# Patient Record
Sex: MALE | Race: BLACK OR AFRICAN AMERICAN
[De-identification: names, ages, dates, MRNs, and addresses within clinical notes are randomized per-mention and may not be internally consistent; named-entity substitution may affect disease eponyms.]

---

## 2018-09-30 ENCOUNTER — HOSPITAL ENCOUNTER (EMERGENCY)
Dept: HOSPITAL 75 - ER | Age: 23
Discharge: HOME | End: 2018-09-30
Payer: SELF-PAY

## 2018-09-30 VITALS — HEIGHT: 66 IN | BODY MASS INDEX: 27.32 KG/M2 | WEIGHT: 170 LBS

## 2018-09-30 VITALS — DIASTOLIC BLOOD PRESSURE: 94 MMHG | SYSTOLIC BLOOD PRESSURE: 143 MMHG

## 2018-09-30 DIAGNOSIS — R53.83: ICD-10-CM

## 2018-09-30 DIAGNOSIS — R53.81: Primary | ICD-10-CM

## 2018-09-30 LAB
ALBUMIN SERPL-MCNC: 4.5 GM/DL (ref 3.2–4.5)
ALP SERPL-CCNC: 53 U/L (ref 40–136)
ALT SERPL-CCNC: 29 U/L (ref 0–55)
AMORPH SED URNS QL MICRO: (no result) /LPF
APTT PPP: YELLOW S
BACTERIA #/AREA URNS HPF: (no result) /HPF
BASOPHILS # BLD AUTO: 0 10^3/UL (ref 0–0.1)
BASOPHILS NFR BLD AUTO: 1 % (ref 0–10)
BILIRUB SERPL-MCNC: 1 MG/DL (ref 0.1–1)
BILIRUB UR QL STRIP: NEGATIVE
BUN/CREAT SERPL: 9
CALCIUM SERPL-MCNC: 9.3 MG/DL (ref 8.5–10.1)
CHLORIDE SERPL-SCNC: 106 MMOL/L (ref 98–107)
CO2 SERPL-SCNC: 25 MMOL/L (ref 21–32)
CREAT SERPL-MCNC: 1.05 MG/DL (ref 0.6–1.3)
EOSINOPHIL # BLD AUTO: 0.4 10^3/UL (ref 0–0.3)
EOSINOPHIL NFR BLD AUTO: 5 % (ref 0–10)
ERYTHROCYTE [DISTWIDTH] IN BLOOD BY AUTOMATED COUNT: 13.2 % (ref 10–14.5)
FIBRINOGEN PPP-MCNC: (no result) MG/DL
GFR SERPLBLD BASED ON 1.73 SQ M-ARVRAT: > 60 ML/MIN
GLUCOSE SERPL-MCNC: 78 MG/DL (ref 70–105)
GLUCOSE UR STRIP-MCNC: NEGATIVE MG/DL
HCT VFR BLD CALC: 40 % (ref 40–54)
HGB BLD-MCNC: 14.1 G/DL (ref 13.3–17.7)
KETONES UR QL STRIP: NEGATIVE
LEUKOCYTE ESTERASE UR QL STRIP: NEGATIVE
LYMPHOCYTES # BLD AUTO: 3.2 X 10^3 (ref 1–4)
LYMPHOCYTES NFR BLD AUTO: 49 % (ref 12–44)
MANUAL DIFFERENTIAL PERFORMED BLD QL: NO
MCH RBC QN AUTO: 30 PG (ref 25–34)
MCHC RBC AUTO-ENTMCNC: 36 G/DL (ref 32–36)
MCV RBC AUTO: 85 FL (ref 80–99)
MONOCYTES # BLD AUTO: 0.6 X 10^3 (ref 0–1)
MONOCYTES NFR BLD AUTO: 9 % (ref 0–12)
NEUTROPHILS # BLD AUTO: 2.4 X 10^3 (ref 1.8–7.8)
NEUTROPHILS NFR BLD AUTO: 36 % (ref 42–75)
NITRITE UR QL STRIP: NEGATIVE
PH UR STRIP: 8 [PH] (ref 5–9)
PLATELET # BLD: 196 10^3/UL (ref 130–400)
PMV BLD AUTO: 11.3 FL (ref 7.4–10.4)
POTASSIUM SERPL-SCNC: 3.5 MMOL/L (ref 3.6–5)
PROT SERPL-MCNC: 7.4 GM/DL (ref 6.4–8.2)
PROT UR QL STRIP: NEGATIVE
RBC # BLD AUTO: 4.69 10^6/UL (ref 4.35–5.85)
RBC #/AREA URNS HPF: (no result) /HPF
SODIUM SERPL-SCNC: 141 MMOL/L (ref 135–145)
SP GR UR STRIP: 1.01 (ref 1.02–1.02)
SQUAMOUS #/AREA URNS HPF: (no result) /HPF
TSH SERPL DL<=0.05 MIU/L-ACNC: 2.31 UIU/ML (ref 0.35–4.94)
UROBILINOGEN UR-MCNC: 1 MG/DL
WBC # BLD AUTO: 6.5 10^3/UL (ref 4.3–11)
WBC #/AREA URNS HPF: (no result) /HPF

## 2018-09-30 PROCEDURE — 87804 INFLUENZA ASSAY W/OPTIC: CPT

## 2018-09-30 PROCEDURE — 84443 ASSAY THYROID STIM HORMONE: CPT

## 2018-09-30 PROCEDURE — 81000 URINALYSIS NONAUTO W/SCOPE: CPT

## 2018-09-30 PROCEDURE — 36415 COLL VENOUS BLD VENIPUNCTURE: CPT

## 2018-09-30 PROCEDURE — 71046 X-RAY EXAM CHEST 2 VIEWS: CPT

## 2018-09-30 PROCEDURE — 80053 COMPREHEN METABOLIC PANEL: CPT

## 2018-09-30 PROCEDURE — 85025 COMPLETE CBC W/AUTO DIFF WBC: CPT

## 2018-09-30 PROCEDURE — 86308 HETEROPHILE ANTIBODY SCREEN: CPT

## 2018-09-30 NOTE — XMS REPORT
[facility]

 Created on: 2018



[place]

External Reference #: [id number]

: 1995

Sex: Male



Demographics







 [place]  [address] [place]  [address]

 

 Preferred Language  Unknown

 

 Marital Status  Unknown

 

 Temple Affiliation  Unknown

 

 Race  Unknown

 

 Ethnic Group  Unknown





Author







 Author  ROLLY PABLO

 

 [facility]  [facility]

 

 Address  [address] [place]  [address]



 

 Phone  [phone]







Care Team Providers







 Care Team Member Name  Role  Phone

 

 ROLLY PABLO  Unavailable  [phone]







PROBLEMS

Unknown Problems



ALLERGIES

No Known Allergies



ENCOUNTERS







 Encounter  Location  Date  Diagnosis

 

 [facility]  [address] [facility] [id number] [phone]  27 Mar, 2018  Acute non-recurrent maxillary sinusitis J01.00







IMMUNIZATIONS







 Vaccine  Route  Administration Date  Status

 

 DEXAMETHASONE 4MG/ML (PER 1 MG)  IM Intramuscular  2018  Administered

 

 DEPO MEDROL 40 MG/ML  IM Intramuscular  2018  Administered







SOCIAL HISTORY

Never Assessed



REASON FOR VISIT

head congestion, denies cough, sinus pressure. been sick for a month. [email or web address]



PLAN OF CARE







 Activity  Details









  









 Follow Up  prn Reason:







VITAL SIGNS







 Height  68 in  2018

 

 Weight  183.2 lbs  2018

 

 Temperature  98.0 degrees Fahrenheit  2018

 

 Heart Rate  72 bpm  2018

 

 Respiratory Rate  18   2018

 

 BMI  27.85 kg/m2  2018

 

 Blood pressure systolic  116 mmHg  2018

 

 Blood pressure diastolic  72 mmHg  2018







MEDICATIONS







 Medication  Instructions  Dosage  Frequency  Start Date  End Date  Duration  
Status

 

 Zyrtec Allergy 10 MG  Orally Once a day  1 tablet  24h  27 Mar, 2018  26 Apr, 
2018  30 day(s)  Active

 

 Augmentin 875-125 MG  Orally every 12 hrs  1 tablet  12h  27 Mar, 2018  6 Apr, 
2018  10 day(s)  Active

 

 Flonase 50 MCG/ACT  Nasally Once a day  1 spray in each nostril  24h  27 Mar, 
2018     30 day(s)  Active







RESULTS

No Results



PROCEDURES







 Procedure  Date Ordered  Result  Body Site

 

 DEXAMETHASONE 4MG/ML (PER 1 MG)  2018      

 

 THER/PROPH/DIAG INJ, SC/IM  2018      

 

 DEPO MEDROL 40 MG/ML  2018      







INSTRUCTIONS





MEDICATIONS ADMINISTERED

No Known Medications

## 2018-09-30 NOTE — DIAGNOSTIC IMAGING REPORT
INDICATION: Flu symptoms.



PA and lateral views of the chest were obtained.



FINDINGS: The heart size, mediastinal configuration, and

pulmonary vascularity are within normal limits. There is no

pleural effusion, pneumothorax, or pneumonia. The osseous

structures are unremarkable. 



IMPRESSION: No acute cardiopulmonary abnormality.



Dictated by: 



  Dictated on workstation # [id number]

## 2018-09-30 NOTE — ED GENERAL
General


Chief Complaint:  General Problems/Pain


Stated Complaint:  TIRED,DIZZY


Nursing Triage Note:  


AMBULATORY TO ED WITH C/O FEELING TIRED/FATIGUED FOR A WEEK WITH CHILLS. 


PRESENTED TO ER BECAUSE HE "COULD NOT GET TO SLEEP AND WAS SHIVERING". DENIES 


COUGH, SORE THROAT, BUT DOES HAVE RUNNY NOSE AND HEADACHE.


Nursing Sepsis Screen:  No Definite Risk


Source of Information:  Patient





History of Present Illness


Date Seen by Provider:  Sep 30, 2018


Time Seen by Provider:  09:25


Initial Comments


PT ARRIVES VIA POV FROM HOME


STATES SINCE YESTERDAY HE HAS BEEN FEELING TIRED, NO ENERGY, DOESN'T FEEL LIKE 

DOING ANYTHING


C/O FEELING COLD ALL THE TIME AND TONIGHT WAS SHIVERING AND COULDN'T SLEEP 

BECAUSE HE WAS COLD


HAS HAD A SLIGHT RUNNY NOSE WITH CLEAR DRAINAGE


C/O SLIGHT HEADACHE


NO COUGH


NO BODY ACHES


NO NECK PAIN OR STIFFNESS


NO SHORTNESS OF BREATH


NO SORE THROAT





NO OTHER SYMPTOMS 





NO HISTORY OF SIMILAR





NO KNOWN SICK CONTACTS. 





TOOK 4 IBUPROFEN AT 2200 TONIGHT, OTHERWISE HAS NOT TAKEN ANYTHING FOR SYMPTOMS





NO PCP--STATES HE LIVES HERE AND WORKS IN [place] ALL WEEK.





Allergies and Home Medications


Allergies


Coded Allergies:  


     No Known Drug Allergies (Unverified , 9/30/18)





Home Medications


No Active Prescriptions or Reported Meds





Patient Home Medication List


Home Medication List Reviewed:  Yes





Review of Systems


Review of Systems


Constitutional:  see HPI, chills; No diaphoresis, No dizziness, No fever; 

malaise


EENTM:  nose congestion; No ear pain, No blurred vision, No eye pain, No throat 

pain


Respiratory:  no symptoms reported; No cough, No short of breath


Cardiovascular:  no symptoms reported; No chest pain, No edema, No palpitations

, No syncope, No vascular heart diseas


Gastrointestinal:  no symptoms reported; No abdominal pain, No diarrhea, No 

nausea, No vomiting


Genitourinary:  no symptoms reported


Musculoskeletal:  no symptoms reported; No back pain, No muscle pain, No muscle 

stiffness, No neck pain


Skin:  no symptoms reported


Psychiatric/Neurological:  See HPI, Headache; Denies Numbness, Denies 

Paresthesia, Denies Seizure, Denies Tingling, Denies Weakness


Hematologic/Lymphatic:  No Symptoms Reported


Immunological/Allergic:  no symptoms reported





Past Medical-Social-Family Hx


Patient Social History


Alcohol Use:  Occasionally Uses


Recreational Drug Use:  No


Smoking Status:  Never a Smoker


2nd Hand Smoke Exposure:  No


Recent Foreign Travel:  No


Contact w/Someone Who Travel:  No


Recent Infectious Disease Expo:  No


Recent Hopitalizations:  No





Immunizations Up To Date


Tetanus Booster (TDap):  Unknown





Seasonal Allergies


Seasonal Allergies:  No





Past Medical History


Surgeries:  No


Respiratory:  No


Cardiac:  No


Neurological:  No


Genitourinary:  No


Gastrointestinal:  No


Musculoskeletal:  No


Endocrine:  No


HEENT:  No


Cancer:  No


Psychosocial:  No


Integumentary:  No


Blood Disorders:  No





Physical Exam


Vital Signs





Vital Signs - First Documented








 9/30/18





 02:49


 


Temp 98.4


 


Pulse 89


 


Resp 17


 


B/P (MAP) 143/94 (110)





Capillary Refill : Less Than 3 Seconds


Height, Weight, BMI


Height: 5'6.00"


Weight: 170lbs. oz. 77.028872tr;  BMI


Method:Stated


General Appearance:  No Apparent Distress, WD/WN


HEENT:  PERRL/EOMI, TMs Normal, Normal ENT Inspection, Pharynx Normal, Moist 

Mucous Membranes


Neck:  Full Range of Motion, Normal Inspection, Non Tender, Supple; No 

Lymphadenopathy (L), No Lymphadenopathy (R), No Tender Lateral, No Tender 

Midline, No Thyromegaly


Respiratory:  Normal Breath Sounds, No Accessory Muscle Use, No Respiratory 

Distress


Cardiovascular:  Regular Rate, Rhythm, No Edema, No JVD, No Murmur, Normal 

Peripheral Pulses


Gastrointestinal:  Normal Bowel Sounds, No Organomegaly, No Pulsatile Mass, Non 

Tender, Soft


Back:  Normal Inspection, No CVA Tenderness, No Vertebral Tenderness


Extremity:  Normal Capillary Refill, Normal Inspection, Normal Range of Motion, 

Non Tender, No Calf Tenderness, No Pedal Edema


Neurologic/Psychiatric:  Alert, Oriented x3, No Motor/Sensory Deficits, Normal 

Mood/Affect, CNs II-XII Norm as Tested


Skin:  Normal Color (PT IS BLACK), Warm/Dry





Progress/Results/Core Measures


Suspected Sepsis


Recent Fever Within 48 Hours:  Yes


Infection Criteria Present:  Suspected New Infection


New/Unexplained  Altered Menta:  No


Sepsis Screen:  No Definite Risk


SIRS


Temperature:98.4 


Pulse: 89 


Respiratory Rate: 17


 


Laboratory Tests


9/30/18 04:00: White Blood Count 6.5


Blood Pressure 143 /94 


Mean: 110


 


Laboratory Tests


9/30/18 04:00: 


Creatinine 1.05, Platelet Count 196, Total Bilirubin 1.0








Results/Orders


Lab Results





Laboratory Tests








Test


 9/30/18


03:55 9/30/18


04:00 Range/Units


 


 


Urine Color YELLOW    


 


Urine Clarity VERY CLOUDY H   


 


Urine pH 8   5-9  


 


Urine Specific Gravity 1.015 L  1.016-1.022  


 


Urine Protein NEGATIVE   NEGATIVE  


 


Urine Glucose (UA) NEGATIVE   NEGATIVE  


 


Urine Ketones NEGATIVE   NEGATIVE  


 


Urine Nitrite NEGATIVE   NEGATIVE  


 


Urine Bilirubin NEGATIVE   NEGATIVE  


 


Urine Urobilinogen 1   NORMAL  MG/DL


 


Urine Leukocyte Esterase NEGATIVE   NEGATIVE  


 


Urine RBC (Auto) NEGATIVE   NEGATIVE  


 


Urine RBC NONE    /HPF


 


Urine WBC NONE    /HPF


 


Urine Squamous Epithelial


Cells RARE 


 


  /HPF





 


Urine Crystals PRESENT H   /LPF


 


Urine Amorphous Sediment


 LARGE CHRISTIE


PHOSPHATE H 


  /LPF





 


Urine Bacteria TRACE    /HPF


 


Urine Casts NONE    /LPF


 


Urine Mucus NEGATIVE    /LPF


 


Urine Culture Indicated NO    


 


White Blood Count


 


 6.5 


 4.3-11.0


10^3/uL


 


Red Blood Count


 


 4.69 


 4.35-5.85


10^6/uL


 


Hemoglobin  14.1  13.3-17.7  G/DL


 


Hematocrit  40  40-54  %


 


Mean Corpuscular Volume  85  80-99  FL


 


Mean Corpuscular Hemoglobin  30  25-34  PG


 


Mean Corpuscular Hemoglobin


Concent 


 36 


 32-36  G/DL





 


Red Cell Distribution Width  13.2  10.0-14.5  %


 


Platelet Count


 


 196 


 130-400


10^3/uL


 


Mean Platelet Volume  11.3 H 7.4-10.4  FL


 


Neutrophils (%) (Auto)  36 L 42-75  %


 


Lymphocytes (%) (Auto)  49 H 12-44  %


 


Monocytes (%) (Auto)  9  0-12  %


 


Eosinophils (%) (Auto)  5  0-10  %


 


Basophils (%) (Auto)  1  0-10  %


 


Neutrophils # (Auto)  2.4  1.8-7.8  X 10^3


 


Lymphocytes # (Auto)  3.2  1.0-4.0  X 10^3


 


Monocytes # (Auto)  0.6  0.0-1.0  X 10^3


 


Eosinophils # (Auto)


 


 0.4 H


 0.0-0.3


10^3/uL


 


Basophils # (Auto)


 


 0.0 


 0.0-0.1


10^3/uL


 


Sodium Level  141  135-145  MMOL/L


 


Potassium Level  3.5 L 3.6-5.0  MMOL/L


 


Chloride Level  106    MMOL/L


 


Carbon Dioxide Level  25  21-32  MMOL/L


 


Anion Gap  10  5-14  MMOL/L


 


Blood Urea Nitrogen  9  7-18  MG/DL


 


Creatinine


 


 1.05 


 0.60-1.30


MG/DL


 


Estimat Glomerular Filtration


Rate 


 > 60 


  





 


BUN/Creatinine Ratio  9   


 


Glucose Level  78    MG/DL


 


Calcium Level  9.3  8.5-10.1  MG/DL


 


Corrected Calcium  8.9  8.5-10.1  MG/DL


 


Total Bilirubin  1.0  0.1-1.0  MG/DL


 


Aspartate Amino Transf


(AST/SGOT) 


 27 


 5-34  U/L





 


Alanine Aminotransferase


(ALT/SGPT) 


 29 


 0-55  U/L





 


Alkaline Phosphatase  53    U/L


 


Total Protein  7.4  6.4-8.2  GM/DL


 


Albumin  4.5  3.2-4.5  GM/DL


 


TSH Cascade Testing


 


 2.31 


 0.35-4.94


UIU/ML


 


Monoscreen  NEGATIVE  NEGATIVE  








Micro Results





Microbiology


9/30/18 Influenza Types A,B Antigen (BALWINDER) - Final, Complete


          





My Orders





Orders - JESSY GANNON DO


Influenza A And B Antigens (9/30/18 02:51)


Cbc With Automated Diff (9/30/18 03:50)


Comprehensive Metabolic Panel (9/30/18 03:50)


Monotest (9/30/18 03:50)


Thyroid Analyzer (9/30/18 03:50)


Ua Culture If Indicated (9/30/18 03:50)


Chest Pa/Lat (2 View) (9/30/18 03:50)





Vital Signs/I&O











 9/30/18





 02:49


 


Temp 98.4


 


Pulse 89


 


Resp 17


 


B/P (MAP) 143/94 (110)





Capillary Refill : Less Than 3 Seconds








Blood Pressure Mean:  110








Progress Note :  


Progress Note


UNEVENTFUL ER STAY


PT RESTED QUIETLY FOR ENTIRE ER STAY





Diagnostic Imaging





Comments


CXR--NO ACUTE PROCESS, PENDING RADIOLOGIST REVIEW


   Reviewed:  Reviewed by Me





Departure


Impression





 Primary Impression:  


 Malaise and fatigue


 Additional Impression:  


 POSSIBLE VIRAL ILLNESS


Disposition:  01 HOME, SELF-CARE


Condition:  Stable





Departure-Patient Inst.


Referrals:  


NO,LOCAL PHYSICIAN (PCP)


Primary Care Physician


Patient Instructions:  VIRAL SYNDROME





Add. Discharge Instructions:  


HOME, REST





LOTS OF CLEAR LIQUIDS





TYLENOL AND MOTRIN AS NEEDED FOR PAIN OR FEVER





FOLLOW UP WITH  OF CHOICE IN 3-4 DAYS IF NO BETTER





All discharge instructions reviewed with patient and/or family. Voiced 

understanding.


Scripts


No Active Prescriptions or Reported Meds











JESSY GANNON DO Sep 30, 2018 04:56